# Patient Record
Sex: MALE | Race: BLACK OR AFRICAN AMERICAN | NOT HISPANIC OR LATINO | Employment: FULL TIME | URBAN - METROPOLITAN AREA
[De-identification: names, ages, dates, MRNs, and addresses within clinical notes are randomized per-mention and may not be internally consistent; named-entity substitution may affect disease eponyms.]

---

## 2019-11-07 ENCOUNTER — HOSPITAL ENCOUNTER (EMERGENCY)
Facility: HOSPITAL | Age: 29
Discharge: HOME | End: 2019-11-07
Attending: EMERGENCY MEDICINE
Payer: COMMERCIAL

## 2019-11-07 VITALS
HEART RATE: 85 BPM | DIASTOLIC BLOOD PRESSURE: 90 MMHG | BODY MASS INDEX: 26.92 KG/M2 | TEMPERATURE: 98 F | SYSTOLIC BLOOD PRESSURE: 133 MMHG | HEIGHT: 67 IN | WEIGHT: 171.5 LBS | OXYGEN SATURATION: 100 % | RESPIRATION RATE: 18 BRPM

## 2019-11-07 DIAGNOSIS — R21 RASH AND NONSPECIFIC SKIN ERUPTION: Primary | ICD-10-CM

## 2019-11-07 PROCEDURE — 99284 EMERGENCY DEPT VISIT MOD MDM: CPT

## 2019-11-07 RX ORDER — DOXYCYCLINE 100 MG/1
100 CAPSULE ORAL 2 TIMES DAILY
Qty: 20 CAPSULE | Refills: 0 | Status: SHIPPED | OUTPATIENT
Start: 2019-11-07 | End: 2019-11-17

## 2019-11-07 RX ORDER — HYDROXYZINE HYDROCHLORIDE 25 MG/1
25 TABLET, FILM COATED ORAL EVERY 6 HOURS
Qty: 15 TABLET | Refills: 0 | Status: ON HOLD | OUTPATIENT
Start: 2019-11-07 | End: 2022-07-13 | Stop reason: HOSPADM

## 2019-11-08 NOTE — ED PROVIDER NOTES
SCRIBE #1 NOTE: I, Quinton Garay, am scribing for, and in the presence of, Jayjay Reese NP. I have scribed the entire note.       History     Chief Complaint   Patient presents with    Rash     Review of patient's allergies indicates:  No Known Allergies      History of Present Illness     HPI    11/7/2019, 10:50 PM  History obtained from the patient      History of Present Illness: Cosmo Tripp is a 29 y.o. male patient who presents to the Emergency Department for evaluation of rash which onset gradually a few days ago. Pt reports it feels like something is moving on his skin. Pt reports white specs in his beard. Symptoms are constant and moderate in severity. Pt reports sxs worse at night. Patient denies any CP, SOB, fever, chills, and all other sxs at this time. PMHx HIV, HTN. Prior Tx includes medication for scabies but pt reports it made sxs worse. No further complaints or concerns at this time.         Arrival mode: Personal vehicle    PCP: Srinath Zayas MD, MD        Past Medical History:  Past Medical History:   Diagnosis Date    HIV (human immunodeficiency virus infection)     Neuropathy     Vitamin D deficiency        Past Surgical History:  History reviewed. No pertinent surgical history.      Family History:  History reviewed. No pertinent family history.       Social History:  Social History     Tobacco Use    Smoking status: Never Smoker    Smokeless tobacco: Never Used   Substance and Sexual Activity    Alcohol use: Never     Frequency: Never    Drug use: Not Currently    Sexual activity: Not on file        Review of Systems     Review of Systems   Constitutional: Negative for fever.   HENT: Negative for sore throat.    Respiratory: Negative for shortness of breath.    Cardiovascular: Negative for chest pain.   Gastrointestinal: Negative for nausea.   Genitourinary: Negative for dysuria.   Musculoskeletal: Negative for back pain.   Skin: Positive for rash.   Neurological: Negative  "for weakness.   Hematological: Does not bruise/bleed easily.   All other systems reviewed and are negative.       Physical Exam     Initial Vitals [11/07/19 2211]   BP Pulse Resp Temp SpO2   (!) 133/90 85 18 97.5 °F (36.4 °C) 100 %      MAP       --          Physical Exam  Nursing Notes and Vital Signs Reviewed.  Constitutional: Patient is in no acute distress. Well-developed and well-nourished.  Head: Atraumatic. Normocephalic.  Eyes: PERRL. EOM intact. Conjunctivae are not pale. No scleral icterus.  ENT: Mucous membranes are moist. Oropharynx is clear and symmetric.    Neck: Supple. Full ROM. No lymphadenopathy.  Cardiovascular: Regular rate. Regular rhythm. No murmurs, rubs, or gallops. Distal pulses are 2+ and symmetric.  Pulmonary/Chest: No respiratory distress. Clear to auscultation bilaterally. No wheezing or rales.  Abdominal: Soft and non-distended.  There is no tenderness.  No rebound, guarding, or rigidity. Good bowel sounds.  Genitourinary: No CVA tenderness  Musculoskeletal: Moves all extremities. No obvious deformities. No edema. No calf tenderness.  Skin: Multiple pustules throughout beard and back. Warm and dry.  Neurological:  Alert, awake, and appropriate.  Normal speech.  No acute focal neurological deficits are appreciated.  Psychiatric: Normal affect. Good eye contact. Appropriate in content.     ED Course   Procedures  ED Vital Signs:  Vitals:    11/07/19 2211   BP: (!) 133/90   Pulse: 85   Resp: 18   Temp: 97.5 °F (36.4 °C)   TempSrc: Oral   SpO2: 100%   Weight: 77.8 kg (171 lb 8.3 oz)   Height: 5' 7" (1.702 m)       Abnormal Lab Results:  Labs Reviewed - No data to display     All Lab Results:  None    Imaging Results:  Imaging Results    None                   The Emergency Provider reviewed the vital signs and test results, which are outlined above.     ED Discussion       11:00 PM: Reassessed pt at this time.  Pt states his condition has improved at this time. Discussed with pt all " pertinent ED information and results. Discussed pt dx and plan of tx. Gave pt all f/u and return to the ED instructions. All questions and concerns were addressed at this time. Pt expresses understanding of information and instructions, and is comfortable with plan to discharge. Pt is stable for discharge.    I discussed with patient and/or family/caretaker that evaluation in the ED does not suggest any emergent or life threatening medical conditions requiring immediate intervention beyond what was provided in the ED, and I believe patient is safe for discharge.  Regardless, an unremarkable evaluation in the ED does not preclude the development or presence of a serious of life threatening condition. As such, patient was instructed to return immediately for any worsening or change in current symptoms.                   ED Medication(s):  Medications - No data to display    Discharge Medication List as of 11/7/2019 10:57 PM      START taking these medications    Details   doxycycline (VIBRAMYCIN) 100 MG Cap Take 1 capsule (100 mg total) by mouth 2 (two) times daily. for 10 days, Starting Thu 11/7/2019, Until Sun 11/17/2019, Print      hydrOXYzine HCl (ATARAX) 25 MG tablet Take 1 tablet (25 mg total) by mouth every 6 (six) hours., Starting Thu 11/7/2019, Print             Follow-up Information     [facility]    Specialty:  Emergency Medicine  Why:  If symptoms worsen, As needed  Contact information:  [address] [facility] [id number] [phone]           PCP.                     Scribe Attestation:   Scribe #1: I performed the above scribed service and the documentation accurately describes the services I performed. I attest to the accuracy of the note.     Attending:   Physician Attestation Statement for Scribe #1: I, Jayjay Reese NP, personally performed the services described in this documentation, as scribed by Quinton Garay, in my presence, and it is both accurate  and complete.           Clinical Impression       ICD-10-CM ICD-9-CM   1. Rash and nonspecific skin eruption R21 782.1       Disposition:   Disposition: Discharged  Condition: Stable       Jayjay Reese NP  11/08/19 0138

## 2022-07-06 PROBLEM — G62.9 NEUROPATHY: Status: ACTIVE | Noted: 2022-07-06

## 2022-07-06 PROBLEM — E55.9 VITAMIN D DEFICIENCY: Status: ACTIVE | Noted: 2022-07-06

## 2022-07-06 PROBLEM — B20 HIV DISEASE: Status: ACTIVE | Noted: 2022-07-06

## 2022-07-06 PROBLEM — F19.10 POLYSUBSTANCE ABUSE: Status: ACTIVE | Noted: 2022-07-06

## 2022-07-06 PROBLEM — I10 HTN (HYPERTENSION): Status: ACTIVE | Noted: 2022-07-06

## 2022-07-06 PROBLEM — K21.9 GERD (GASTROESOPHAGEAL REFLUX DISEASE): Status: ACTIVE | Noted: 2022-07-06
